# Patient Record
Sex: FEMALE | Race: ASIAN
[De-identification: names, ages, dates, MRNs, and addresses within clinical notes are randomized per-mention and may not be internally consistent; named-entity substitution may affect disease eponyms.]

---

## 2022-01-28 ENCOUNTER — APPOINTMENT (OUTPATIENT)
Dept: NEUROSURGERY | Facility: CLINIC | Age: 72
End: 2022-01-28

## 2022-07-15 ENCOUNTER — APPOINTMENT (OUTPATIENT)
Dept: NEUROSURGERY | Facility: CLINIC | Age: 72
End: 2022-07-15

## 2022-07-15 VITALS — HEIGHT: 60 IN | WEIGHT: 125 LBS | BODY MASS INDEX: 24.54 KG/M2

## 2022-07-15 PROCEDURE — 99214 OFFICE O/P EST MOD 30 MIN: CPT

## 2022-07-15 NOTE — PHYSICAL EXAM
[FreeTextEntry1] : awake alert in no acute distress\par Midline tenderness with palpation of lumbar spine\par Moves all ext x 4 with good strength\par ROM of lumbar spine somewhat limited by pain\par negative straight leg raise\par No pitts's noted\par

## 2022-07-15 NOTE — ASSESSMENT
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 Previously, we discussed surgical intervention given failure in conservative management. We discussed at length L3-4, L4-5 decompressive laminectomy with interlaminar stabilization. Risks and benefits were discussed including but not limited to DVT/PE, pneumonia, infection, risk of need for further surgeries, paralysis, persistent pain. Patient understands and we will schedule for surgery.\par \par Patient also reports neck pain here with cervical XR report ordered by PCP. Discussed with Dr. Pace and results reviewed with patient. Report shows multiple levels of cervical spondylosis most prominent of C4-5. We will follow her clinically.

## 2022-07-15 NOTE — REASON FOR VISIT
[FreeTextEntry1] : 71 year old female p/w follow up for her lower back pain. Of note, patient has neurogenic claudication of L3-4, L4-5 for 3-4 years failing conservative management in the past including injection therapy with pain management and physical therapy. She is still experiencing severe paresthesia/cramping and pain to upper thigh down lower leg bilaterally. These symptoms are worse with walking and standing. Leaning over on a shopping cart improve her symptoms. The symptoms wake her up at night. She also complaints of slight urinary urgency and frequency; increased since previous visit. Previous visit we discussed surgical intervention - she at that time was thinking about it but now symptoms is significantly limiting her daily life. Also, patient today is here with mild right neck pain without radicular symptoms